# Patient Record
Sex: MALE | URBAN - METROPOLITAN AREA
[De-identification: names, ages, dates, MRNs, and addresses within clinical notes are randomized per-mention and may not be internally consistent; named-entity substitution may affect disease eponyms.]

---

## 2021-02-18 ENCOUNTER — OFFICE VISIT (OUTPATIENT)
Dept: URBAN - METROPOLITAN AREA CLINIC 72 | Facility: CLINIC | Age: 56
End: 2021-02-18
Payer: COMMERCIAL

## 2021-02-18 DIAGNOSIS — H53.10 SUBJECTIVE VISUAL DISTURBANCE: ICD-10-CM

## 2021-02-18 DIAGNOSIS — H53.8 OTHER VISUAL DISTURBANCES: ICD-10-CM

## 2021-02-18 DIAGNOSIS — H11.153 PINGUECULA, BILATERAL: Primary | ICD-10-CM

## 2021-02-18 PROCEDURE — 99203 OFFICE O/P NEW LOW 30 MIN: CPT | Performed by: OPTOMETRIST

## 2021-02-18 ASSESSMENT — INTRAOCULAR PRESSURE
OD: 12
OS: 12

## 2021-02-18 NOTE — IMPRESSION/PLAN
Impression: Other visual disturbances: H53.8. OCT RNFL ordered and done today, 95 93 Pt notes that he is having issues with [facility], no significant findings on today's exam Plan: discussed possible diagnosis. Educated pt on getting testing vs monitoring. If pt is not better in 2-4 weeks will have pt RTC and discuss CT. Pt voices understanding.

## 2021-02-18 NOTE — IMPRESSION/PLAN
Impression: Pinguecula, bilateral: H11.153.
nasal pinguecula Plan: Discussed diagnosis in detail with patient. Discussed treatment options with patient, thought treatment is not required or recommended at this time. Reassured patient of current condition and treatment. Will continue to observe condition and or symptoms. Patient instructed to use artificial tears as needed.